# Patient Record
Sex: MALE | Race: WHITE | NOT HISPANIC OR LATINO | Employment: OTHER | URBAN - METROPOLITAN AREA
[De-identification: names, ages, dates, MRNs, and addresses within clinical notes are randomized per-mention and may not be internally consistent; named-entity substitution may affect disease eponyms.]

---

## 2017-01-04 ENCOUNTER — PROCEDURE VISIT (OUTPATIENT)
Dept: OPHTHALMOLOGY | Facility: CLINIC | Age: 57
End: 2017-01-04
Payer: COMMERCIAL

## 2017-01-04 DIAGNOSIS — Z79.4 TYPE 2 DIABETES MELLITUS WITH BOTH EYES AFFECTED BY PROLIFERATIVE RETINOPATHY AND MACULAR EDEMA, WITH LONG-TERM CURRENT USE OF INSULIN: Primary | ICD-10-CM

## 2017-01-04 DIAGNOSIS — E11.3513 TYPE 2 DIABETES MELLITUS WITH BOTH EYES AFFECTED BY PROLIFERATIVE RETINOPATHY AND MACULAR EDEMA, WITH LONG-TERM CURRENT USE OF INSULIN: Primary | ICD-10-CM

## 2017-01-04 PROCEDURE — 96372 THER/PROPH/DIAG INJ SC/IM: CPT | Mod: S$GLB,,, | Performed by: OPHTHALMOLOGY

## 2017-01-04 RX ORDER — LISINOPRIL 20 MG/1
20 TABLET ORAL NIGHTLY
Refills: 4 | COMMUNITY
Start: 2016-12-24

## 2017-01-04 RX ORDER — ATENOLOL 100 MG/1
100 TABLET ORAL 2 TIMES DAILY
Refills: 3 | COMMUNITY
Start: 2016-11-21 | End: 2018-01-10

## 2017-01-04 RX ORDER — SITAGLIPTIN 25 MG/1
25 TABLET, FILM COATED ORAL DAILY
Refills: 3 | COMMUNITY
Start: 2016-12-24 | End: 2018-01-10

## 2017-01-04 RX ORDER — BUPROPION HYDROCHLORIDE 300 MG/1
300 TABLET ORAL DAILY
Refills: 3 | COMMUNITY
Start: 2016-10-14

## 2017-01-04 RX ORDER — CIPROFLOXACIN HYDROCHLORIDE 3 MG/ML
1 SOLUTION/ DROPS OPHTHALMIC 4 TIMES DAILY
Qty: 5 ML | Refills: 0 | Status: SHIPPED | OUTPATIENT
Start: 2017-01-04 | End: 2017-01-08

## 2017-01-04 RX ORDER — DEXTROSE 4 G
TABLET,CHEWABLE ORAL
COMMUNITY
Start: 2016-09-19

## 2017-01-04 RX ORDER — INSULIN GLARGINE 100 [IU]/ML
INJECTION, SOLUTION SUBCUTANEOUS
Refills: 3 | COMMUNITY
Start: 2016-11-19 | End: 2018-01-10 | Stop reason: SDUPTHER

## 2017-01-04 RX ORDER — ROSUVASTATIN CALCIUM 20 MG/1
20 TABLET, COATED ORAL DAILY
Refills: 3 | COMMUNITY
Start: 2016-12-24 | End: 2017-12-07 | Stop reason: SDUPTHER

## 2017-01-04 RX ADMIN — Medication 1.25 MG: at 04:01

## 2017-01-04 NOTE — PROGRESS NOTES
===============================  Barrett Zamora,   56 y.o. male   Last visit [facility]: :12/13/2016   Last visit eye dept. 12/13/2016  [facility]:  Uncorrected distance visual acuity was CF at 3' in the right eye and CF at 3' in the left eye.   Not recorded         Not recorded         Not recorded        Chief Complaint   Patient presents with    PDR/ME     IVFA/ AVASTIN OU        HPI     PDR/ME    Additional comments: IVFA/ AVASTIN OU           Comments   DM  Bdr  CSME ou. Os>od  Eylea OS#1= 2/23/16       Last edited by Kim Saenz on 1/4/2017  2:18 PM. (History)      Read Studies: y  Vitalsy    ________________  1/4/2017  1. Type 2 diabetes mellitus with both eyes affected by proliferative retinopathy and macular edema, with long-term current use of insulin      .  Very active pdr od  And $+ dme ou   1/4/2017  Diagnosis :  Ou dme pdr  Today:   Avastin (Bevacizumab) 1.25 mg/0.05 ml Intravitreal Injection , OU   Follow up: rtc 2 weeks begin prp od      Call 24/7 for any worsening of vision. Check  OU QD. Gave my home phone number.      Procedure  Note:   OU}  Avastin (Bevacizumab) 1.25 mg/0.05 ml Intravitreal Injection    I have explained the Risks, Benefits and Alternatives of the procedure in detail.  The patient voices understanding and all questions have been answered.  The patient agrees to proceed as discussed.  LIDOCAINE 2%  subconj bleb  was used for anesthesia.  Topical betadine was used for antisepsis.  0.05 cc was  injected 3.7 mm from corneal limbus in the inferotemporal quadrant.  Following injection the IOP was less than thirty (<30) by tonopen.  The eye was then thoroughly irrigated with BSS.  Patient tolerated procedure well.  No complications were observed.  The Patient was educated that mild irritation tonight was normal secondary to topical antispsis use.  Pt was advised to call at any time day or night for pain, redness, or any decline in vision. I gave the patient my home number as well as the  clinic on call number. Daily visual checks and Amsler grid testing were reviewed.    CURT Costa MD  Procedure ordered: y  Consent: y  Pre auth: y  MAR:y  Opnote: y  Charge capture:y  Sided procedure note: y     ===========================

## 2017-01-18 ENCOUNTER — PROCEDURE VISIT (OUTPATIENT)
Dept: OPHTHALMOLOGY | Facility: CLINIC | Age: 57
End: 2017-01-18
Payer: COMMERCIAL

## 2017-01-18 DIAGNOSIS — Z79.4 TYPE 2 DIABETES MELLITUS WITH BOTH EYES AFFECTED BY PROLIFERATIVE RETINOPATHY AND MACULAR EDEMA, WITH LONG-TERM CURRENT USE OF INSULIN: Primary | ICD-10-CM

## 2017-01-18 DIAGNOSIS — E11.3513 TYPE 2 DIABETES MELLITUS WITH BOTH EYES AFFECTED BY PROLIFERATIVE RETINOPATHY AND MACULAR EDEMA, WITH LONG-TERM CURRENT USE OF INSULIN: Primary | ICD-10-CM

## 2017-01-18 PROCEDURE — 99499 UNLISTED E&M SERVICE: CPT | Mod: S$GLB,,, | Performed by: OPHTHALMOLOGY

## 2017-01-18 RX ORDER — INSULIN LISPRO 100 [IU]/ML
INJECTION, SOLUTION INTRAVENOUS; SUBCUTANEOUS
COMMUNITY
Start: 2016-05-05 | End: 2017-11-08 | Stop reason: CLARIF

## 2017-01-18 NOTE — PROGRESS NOTES
===============================  Barrett Zamora,   56 y.o. male   Last visit [facility]: :1/4/2017   Last visit eye dept. 1/4/2017  [facility]:  Uncorrected distance visual acuity was 20/400 in the right eye and 20/80 in the left eye.   Not recorded         Not recorded         Not recorded        Chief Complaint   Patient presents with    PDR/ME     PRP OD        HPI     PDR/ME    Additional comments: PRP OD           Comments   DM  Bdr  CSME ou. Os>od  Eylea OS#1= 2/23/16  AVASTIN OU 1/4/17       Last edited by Kim Saenz on 1/18/2017  2:22 PM. (History)      Read Studies: n  Vitalsy    ________________  1/18/2017  1. Type 2 diabetes mellitus with both eyes affected by proliferative retinopathy and macular edema, with long-term current use of insulin      1/18/2017  Diagnosis :  pdr OD}   Plan : prp     OD} today  Next visit :     Return as scheduled for avastin      Laser Procedure Note    Laser: prp  ARGON  Power: 700  Duration: 10  Interval: 250  Number: [id number]  Lens centralis  Complications: None  Procedure ordered: y  Consent: y  Opnote: y  Charge capture:y  Sided procedure note: y             ===========================

## 2017-01-18 NOTE — MR AVS SNAPSHOT
[facility] - Ophthalmology  [address] [facility] [address] [place] [address]  Phone: [phone]  Fax: [phone]                  Barrett Zamora   2017 2:15 PM   Procedure visit    Description:  Male : 1960   Provider:  CURT Costa MD   Department:  [facility] - Ophthalmology           Reason for Visit     PDR/ME           Diagnoses this Visit        Comments    Type 2 diabetes mellitus with both eyes affected by proliferative retinopathy and macular edema, with long-term current use of insulin    -  Primary            To Do List           Future Appointments        Provider Department Dept Phone    2017 1:30 PM CURT Costa MD [facility] Ophthalmology [phone]      Goals (5 Years of Data)     None      Follow-Up and Disposition     Return in about 6 weeks (around 3/1/2017) for avastin.      [facility] On Call     [facility] On Call Nurse Care Line -  Assistance  Registered nurses in the [facility] On Call Center provide clinical advisement, health education, appointment booking, and other advisory services.  Call for this free service at [phone].             Medications           Message regarding Medications     Verify the changes and/or additions to your medication regime listed below are the same as discussed with your clinician today.  If any of these changes or additions are incorrect, please notify your healthcare provider.             Verify that the below list of medications is an accurate representation of the medications you are currently taking.  If none reported, the list may be blank. If incorrect, please contact your healthcare provider. Carry this list with you in case of emergency.           Current Medications     aspirin (ECOTRIN) 325 MG EC tablet Take 0 tablets by mouth.    atenolol (TENORMIN) 100 MG tablet Take 100 mg by mouth 2 (two) times daily.    atenolol (TENORMIN) 25 MG tablet     blood sugar diagnostic Strp Test blood sugar 4 times a day    blood-glucose meter  "(ACCU-CHEK BECCA) [facility] Accu-Chek Becca Smartview. Use to check blood sugar    buPROPion (WELLBUTRIN XL) 300 MG 24 hr tablet Take 300 mg by mouth once daily.    duloxetine (CYMBALTA) 60 MG capsule TAKE 1 CAPSULE BY MOUTH DAILY.    fenofibrate (TRICOR) 145 MG tablet TAKE 1 TABLET BY MOUTH DAILY    flaxseed oil 1,000 mg Cap 1,000 mg.    furosemide (LASIX) 20 MG tablet 20 mg.    glipizide-metformin (METAGLIP) 5-500 mg per tablet Take 1 tablet by mouth 2 (two) times daily before meals.    insulin glargine (LANTUS) 100 unit/mL Crtg     insulin lispro (HUMALOG KWIKPEN) 100 unit/mL InPn pen INJECT UP TO 40 UNITS INTO THE SKIN DAILY PER SLIDING SCALE    insulin lispro (HUMALOG KWIKPEN) 100 unit/mL InPn pen INJECT UP TO 40 UNITS INTO SKIN DAILY PER SLIDING SCALE    JANUVIA 25 mg Tab Take 25 mg by mouth once daily.    lancets Misc USE TO TEST BLOOD SUGAR 3 TIMES DAILY AS DIRECTED    LANTUS SOLOSTAR 100 unit/mL (3 mL) InPn pen INJECT 80 UNITS INTO THE SKIN 2 TIMES DAILY AS DIRECTED    lisinopril (PRINIVIL,ZESTRIL) 20 MG tablet Take 20 mg by mouth every evening.    lisinopril (PRINIVIL,ZESTRIL) 5 MG tablet Take 0 tablets by mouth.    neomycin-polymyxin-dexamethasone (MAXITROL) 3.5mg/mL-10,000 unit/mL-0.1 % DrpS INSTILL 1 DROP INTO BOTH EYES 4 TIMES A DAY    nitroGLYCERIN (NITROSTAT) 0.3 MG SL tablet     pen needle, diabetic 31 gauge x 5/16" Ndle USE AS DIRECTED    prasugrel (EFFIENT) 10 mg Tab Take 10 mg by mouth once daily.    rosuvastatin (CRESTOR) 20 MG tablet Take 20 mg by mouth once daily.    rosuvastatin (CRESTOR) 40 MG Tab Take 10 mg by mouth every evening.    saxagliptin (ONGLYZA) 2.5 mg Tab tablet 1 tablet.    simvastatin (ZOCOR) 40 MG tablet Take 40 mg by mouth every evening.           Clinical Reference Information           Allergies as of 1/18/2017     No Known Drug Allergies      Immunizations Administered on Date of Encounter - 1/18/2017     None      Orders Placed During Today's Visit      Normal Orders This Visit "    Pan Retinal Photocoagulation - OD - Right Eye

## 2017-02-08 ENCOUNTER — PROCEDURE VISIT (OUTPATIENT)
Dept: OPHTHALMOLOGY | Facility: CLINIC | Age: 57
End: 2017-02-08
Payer: COMMERCIAL

## 2017-02-08 DIAGNOSIS — E11.3513 TYPE 2 DIABETES MELLITUS WITH BOTH EYES AFFECTED BY PROLIFERATIVE RETINOPATHY AND MACULAR EDEMA, WITH LONG-TERM CURRENT USE OF INSULIN: Primary | ICD-10-CM

## 2017-02-08 DIAGNOSIS — Z79.4 TYPE 2 DIABETES MELLITUS WITH BOTH EYES AFFECTED BY PROLIFERATIVE RETINOPATHY AND MACULAR EDEMA, WITH LONG-TERM CURRENT USE OF INSULIN: Primary | ICD-10-CM

## 2017-02-08 PROCEDURE — 92134 CPTRZ OPH DX IMG PST SGM RTA: CPT | Mod: S$GLB,,, | Performed by: OPHTHALMOLOGY

## 2017-02-08 PROCEDURE — 67028 INJECTION EYE DRUG: CPT | Mod: 50,S$GLB,, | Performed by: OPHTHALMOLOGY

## 2017-02-08 PROCEDURE — 99499 UNLISTED E&M SERVICE: CPT | Mod: S$GLB,,, | Performed by: OPHTHALMOLOGY

## 2017-02-08 RX ADMIN — Medication 1.25 MG: at 02:02

## 2017-02-08 NOTE — MR AVS SNAPSHOT
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      [facility]     [facility] On Call Nurse Care Line -  Assistance  Registered nurses in the [facility] On Call Center provide clinical advisement, health education, appointment booking, and other advisory services.  Call for this free service at [phone].             Medications           Message regarding Medications     Verify the changes and/or additions to your medication regime listed below are the same as discussed with your clinician today.  If any of these changes or additions are incorrect, please notify your healthcare provider.        These medications were administered today        Dose Freq    bevacizumab (AVASTIN) 2.5 mg/0.10 mL 1.25 mg 1.25 mg Clinic/HOD 1 time    Sig: Inject 0.05 mLs (1.25 mg total) into the eye one time.    Class: Normal    Route: Intraocular    bevacizumab (AVASTIN) 2.5 mg/0.10 mL 1.25 mg 1.25 mg Clinic/HOD 1 time    Sig: Inject 0.05 mLs (1.25 mg total) into the eye one time.    Class: Normal    Route: Intraocular           Verify that the below list of medications is an accurate representation of the medications you are currently taking.  If none reported, the list may be blank. If incorrect, please contact your healthcare provider. Carry this list with you in case of emergency.           Current Medications   "   aspirin (ECOTRIN) 325 MG EC tablet Take 0 tablets by mouth.    atenolol (TENORMIN) 100 MG tablet Take 100 mg by mouth 2 (two) times daily.    atenolol (TENORMIN) 25 MG tablet     blood sugar diagnostic Strp Test blood sugar 4 times a day    blood-glucose meter (ACCU-CHEK BECCA) Misc Accu-Chek Becca Smartview. Use to check blood sugar    buPROPion (WELLBUTRIN XL) 300 MG 24 hr tablet Take 300 mg by mouth once daily.    duloxetine (CYMBALTA) 60 MG capsule TAKE 1 CAPSULE BY MOUTH DAILY.    fenofibrate (TRICOR) 145 MG tablet TAKE 1 TABLET BY MOUTH DAILY    flaxseed oil 1,000 mg Cap 1,000 mg.    furosemide (LASIX) 20 MG tablet 20 mg.    glipizide-metformin (METAGLIP) 5-500 mg per tablet Take 1 tablet by mouth 2 (two) times daily before meals.    insulin glargine (LANTUS) 100 unit/mL Crtg     insulin lispro (HUMALOG KWIKPEN) 100 unit/mL InPn pen INJECT UP TO 40 UNITS INTO THE SKIN DAILY PER SLIDING SCALE    insulin lispro (HUMALOG KWIKPEN) 100 unit/mL InPn pen INJECT UP TO 40 UNITS INTO SKIN DAILY PER SLIDING SCALE    JANUVIA 25 mg Tab Take 25 mg by mouth once daily.    lancets Misc USE TO TEST BLOOD SUGAR 3 TIMES DAILY AS DIRECTED    LANTUS SOLOSTAR 100 unit/mL (3 mL) InPn pen INJECT 80 UNITS INTO THE SKIN 2 TIMES DAILY AS DIRECTED    lisinopril (PRINIVIL,ZESTRIL) 20 MG tablet Take 20 mg by mouth every evening.    lisinopril (PRINIVIL,ZESTRIL) 5 MG tablet Take 0 tablets by mouth.    neomycin-polymyxin-dexamethasone (MAXITROL) 3.5mg/mL-10,000 unit/mL-0.1 % DrpS INSTILL 1 DROP INTO BOTH EYES 4 TIMES A DAY    nitroGLYCERIN (NITROSTAT) 0.3 MG SL tablet     pen needle, diabetic 31 gauge x 5/16" Ndle USE AS DIRECTED    prasugrel (EFFIENT) 10 mg Tab Take 10 mg by mouth once daily.    rosuvastatin (CRESTOR) 20 MG tablet Take 20 mg by mouth once daily.    rosuvastatin (CRESTOR) 40 MG Tab Take 10 mg by mouth every evening.    saxagliptin (ONGLYZA) 2.5 mg Tab tablet 1 tablet.    simvastatin (ZOCOR) 40 MG tablet Take 40 mg by mouth " every evening.           Clinical Reference Information           Allergies as of 2/8/2017     No Known Drug Allergies      Immunizations Administered on Date of Encounter - 2/8/2017     None      Orders Placed During Today's Visit      Normal Orders This Visit    Posterior Segment OCT Retina-Both eyes     Prior Authorization Order       Language Assistance Services     ATTENTION: Language assistance services are available, free of charge. Please call [phone].      ATENCIÓN: Si habla valarie, tiene a carpenter disposición servicios gratuitos de asistencia lingüística. Llame al [phone].     CHÚ Ý: N?u b?n nói Ti?ng Vi?t, có các d?ch v? h? tr? ngôn ng? mi?n phí dành cho b?n. G?i s? [phone].         [facility] complies with applicable Federal civil rights laws and does not discriminate on the basis of race, color, national origin, age, disability, or sex.

## 2017-02-08 NOTE — PROGRESS NOTES
===============================  Barrett Zamora,   56 y.o. male   Last visit [facility]: :1/18/2017   Last visit eye dept. 1/18/2017  [facility]:  Uncorrected distance visual acuity was 20/200 in the right eye and 20/80 in the left eye.   Not recorded         Not recorded         Not recorded        Chief Complaint   Patient presents with    PDR / CSME     s/p recent avastin ou  / s/p prp od        HPI     PDR / CSME    Additional comments: s/p recent avastin ou  / s/p prp od           Comments   DM  Bdr  CSME ou. Os>od  Eylea OS#1= 2/23/16  AVASTIN OU 1/4/17  PRP OD 1/18/17       Last edited by REJI Bah on 2/8/2017  1:44 PM. (History)      Read Studies: ***  Vitals***    ________________  2/8/2017  1. Type 2 diabetes mellitus with both eyes affected by proliferative retinopathy and macular edema, with long-term current use of insulin      ***.  Sp ou tx for dme ash undergoing prp  Prp#2 today           ===========================

## 2017-02-08 NOTE — PROGRESS NOTES
===============================  Barrett Zamora,   56 y.o. male   Last visit [facility]: :1/18/2017   Last visit eye dept. 1/18/2017  [facility]:  Uncorrected distance visual acuity was 20/200 in the right eye and 20/80 in the left eye.   Not recorded         Not recorded         Not recorded        Chief Complaint   Patient presents with    PDR / CSME     s/p recent avastin ou  / s/p prp od        HPI     PDR / CSME    Additional comments: s/p recent avastin ou  / s/p prp od           Comments   DM  Bdr  CSME ou. Os>od  Eylea OS#1= 2/23/16  AVASTIN OU 1/4/17  PRP OD 1/18/17       Last edited by REJI Bah on 2/8/2017  1:44 PM. (History)      Read Studies: y  Vitalsy    ________________  2/8/2017  1. Type 2 diabetes mellitus with both eyes affected by proliferative retinopathy and macular edema, with long-term current use of insulin      .  Ou dme  Recent ou avstin 1/1/17  Then prp od 2 weeks ago   Today oct s l better od - but + me  Os dme sl worse   rec ou avstin today rtc 1 month cosider eylea   So plan:  eyela today and 1 more then 2 week interval prp od  afte eyele#2    2/8/2017  Diagnosis :  oou dme- sl worse os  Today:   Eylea (afibercept) 2 mg/0.05 ml Intravitreal Injection , OU   Follow up: rtc 1 mo eyela -         Call 24/7 for any worsening of vision. Check  OU QD. Gave my home phone number.      Procedure  Note:   OU}  Eylea (afibercept) 2 mg/0.05 ml Intravitreal Injection    I have explained the Risks, Benefits and Alternatives of the procedure in detail.  The patient voices understanding and all questions have been answered.  The patient agrees to proceed as discussed.  LIDOCAINE 2%  subconj bleb  was used for anesthesia.  Topical betadine was used for antisepsis.  0.05 cc was  injected 3.7 mm from corneal limbus in the inferotemporal quadrant.  Following injection the IOP was less than thirty (<30) by tonopen.  The eye was then thoroughly irrigated with BSS.  Patient tolerated procedure well.  No  complications were observed.  The Patient was educated that mild irritation tonight was normal secondary to topical antispsis use.  Pt was advised to call at any time day or night for pain, redness, or any decline in vision. I gave the patient my home number as well as the clinic on call number. Daily visual checks and Amsler grid testing were reviewed.  ciloxan Antibiotic Drops to be used 4 times daily for 4 days  CURT Costa MD  Procedure ordered: y  Consent: y  Pre auth: y  MAR:y  Opnote: y  Charge capture:y  Sided procedure note: y       ===========================

## 2017-03-30 ENCOUNTER — PROCEDURE VISIT (OUTPATIENT)
Dept: OPHTHALMOLOGY | Facility: CLINIC | Age: 57
End: 2017-03-30
Payer: COMMERCIAL

## 2017-03-30 DIAGNOSIS — E11.3513 TYPE 2 DIABETES MELLITUS WITH BOTH EYES AFFECTED BY PROLIFERATIVE RETINOPATHY AND MACULAR EDEMA, WITH LONG-TERM CURRENT USE OF INSULIN: Primary | ICD-10-CM

## 2017-03-30 DIAGNOSIS — Z79.4 TYPE 2 DIABETES MELLITUS WITH BOTH EYES AFFECTED BY PROLIFERATIVE RETINOPATHY AND MACULAR EDEMA, WITH LONG-TERM CURRENT USE OF INSULIN: Primary | ICD-10-CM

## 2017-03-30 PROCEDURE — 92134 CPTRZ OPH DX IMG PST SGM RTA: CPT | Mod: S$GLB,,, | Performed by: OPHTHALMOLOGY

## 2017-03-30 PROCEDURE — 67028 INJECTION EYE DRUG: CPT | Mod: 50,S$GLB,, | Performed by: OPHTHALMOLOGY

## 2017-03-30 PROCEDURE — 99499 UNLISTED E&M SERVICE: CPT | Mod: S$GLB,,, | Performed by: OPHTHALMOLOGY

## 2017-03-30 RX ORDER — CIPROFLOXACIN HYDROCHLORIDE 3 MG/ML
1 SOLUTION/ DROPS OPHTHALMIC 4 TIMES DAILY
Qty: 5 ML | Refills: 0 | Status: SHIPPED | OUTPATIENT
Start: 2017-03-30 | End: 2017-04-03

## 2017-03-30 RX ORDER — PEN NEEDLE, DIABETIC 31 GX5/16"
NEEDLE, DISPOSABLE MISCELLANEOUS
Refills: 3 | COMMUNITY
Start: 2017-01-03

## 2017-03-30 RX ORDER — LISINOPRIL 10 MG/1
TABLET ORAL
Refills: 6 | COMMUNITY
Start: 2017-03-07 | End: 2018-01-10 | Stop reason: SDUPTHER

## 2017-03-30 NOTE — MR AVS SNAPSHOT
[facility] - Ophthalmology  [address] [facility] [address] [place] [address]  Phone: [phone]  Fax: [phone]                  Barrett Zamora   3/30/2017 8:30 AM   Procedure visit    Description:  Male : 1960   Provider:  CURT Costa MD   Department:  [facility] - Ophthalmology           Reason for Visit     PDR           Diagnoses this Visit        Comments    Type 2 diabetes mellitus with both eyes affected by proliferative retinopathy and macular edema, with long-term current use of insulin    -  Primary            To Do List           Goals (5 Years of Data)     None      Follow-Up and Disposition     Return in about 1 month (around 2017).       These Medications        Disp Refills Start End    ciprofloxacin HCl (CILOXAN) 0.3 % ophthalmic solution 5 mL 0 3/30/2017 4/3/2017    Place 1 drop into both eyes 4 (four) times daily. - Both Eyes    Pharmacy: [facility]/pharmacy #[id number] - [place], [place] - [address].  #: [phone]         [facility] On Call     [facility] On Call Nurse Care Line -  Assistance  Unless otherwise directed by your provider, please contact [facility], our nurse care line that is available for  assistance.     Registered nurses in the [facility] On Call Center provide: appointment scheduling, clinical advisement, health education, and other advisory services.  Call: [phone] (toll free)               Medications           Message regarding Medications     Verify the changes and/or additions to your medication regime listed below are the same as discussed with your clinician today.  If any of these changes or additions are incorrect, please notify your healthcare provider.        START taking these NEW medications        Refills    ciprofloxacin HCl (CILOXAN) 0.3 % ophthalmic solution 0    Sig: Place 1 drop into both eyes 4 (four) times daily.    Class: Normal    Route: Both Eyes      These medications were administered today        Dose Freq    aflibercept  "Soln 2 mg 2 mg Clinic/HOD 1 time    Si.05 mLs (2 mg total) by Intravitreal route one time.    Class: Normal    Route: Intravitreal    aflibercept Soln 2 mg 2 mg Clinic/HOD 1 time    Si.05 mLs (2 mg total) by Intravitreal route one time.    Class: Normal    Route: Intravitreal           Verify that the below list of medications is an accurate representation of the medications you are currently taking.  If none reported, the list may be blank. If incorrect, please contact your healthcare provider. Carry this list with you in case of emergency.           Current Medications     aspirin (ECOTRIN) 325 MG EC tablet Take 0 tablets by mouth.    atenolol (TENORMIN) 100 MG tablet Take 100 mg by mouth 2 (two) times daily.    BD INSULIN PEN NEEDLE UF MINI 31 gauge x 3/16" Ndle USE AS DIRECTED WITH INSULIN MAX 5 PER DAY    blood sugar diagnostic Strp Test blood sugar 4 times a day    blood-glucose meter (ACCU-CHEK BECCA) Mis Accu-Chek Becca Smartview. Use to check blood sugar    buPROPion (WELLBUTRIN XL) 300 MG 24 hr tablet Take 300 mg by mouth once daily.    ciprofloxacin HCl (CILOXAN) 0.3 % ophthalmic solution Place 1 drop into both eyes 4 (four) times daily.    duloxetine (CYMBALTA) 60 MG capsule TAKE 1 CAPSULE BY MOUTH DAILY.    fenofibrate (TRICOR) 145 MG tablet TAKE 1 TABLET BY MOUTH DAILY    flaxseed oil 1,000 mg Cap 1,000 mg.    furosemide (LASIX) 20 MG tablet 20 mg.    glipizide-metformin (METAGLIP) 5-500 mg per tablet Take 1 tablet by mouth 2 (two) times daily before meals.    insulin glargine (LANTUS) 100 unit/mL Crtg     insulin lispro (HUMALOG KWIKPEN) 100 unit/mL InPn pen INJECT UP TO 40 UNITS INTO THE SKIN DAILY PER SLIDING SCALE    insulin lispro (HUMALOG KWIKPEN) 100 unit/mL InPn pen INJECT UP TO 40 UNITS INTO SKIN DAILY PER SLIDING SCALE    JANUVIA 25 mg Tab Take 25 mg by mouth once daily.    lancets Misc USE TO TEST BLOOD SUGAR 3 TIMES DAILY AS DIRECTED    LANTUS SOLOSTAR 100 unit/mL (3 mL) InPn pen " "INJECT 80 UNITS INTO THE SKIN 2 TIMES DAILY AS DIRECTED    lisinopril (PRINIVIL,ZESTRIL) 20 MG tablet Take 20 mg by mouth every evening.    lisinopril (PRINIVIL,ZESTRIL) 5 MG tablet Take 0 tablets by mouth.    lisinopril 10 MG tablet TAKE 1 TABLET BY MOUTH EVERY MORNING AND 2 TABLET EVERY EVENING    neomycin-polymyxin-dexamethasone (MAXITROL) 3.5mg/mL-10,000 unit/mL-0.1 % DrpS INSTILL 1 DROP INTO BOTH EYES 4 TIMES A DAY    nitroGLYCERIN (NITROSTAT) 0.3 MG SL tablet     pen needle, diabetic 31 gauge x 5/16" Ndle USE AS DIRECTED    prasugrel (EFFIENT) 10 mg Tab Take 10 mg by mouth once daily.    rosuvastatin (CRESTOR) 20 MG tablet Take 20 mg by mouth once daily.    rosuvastatin (CRESTOR) 40 MG Tab Take 10 mg by mouth every evening.    saxagliptin (ONGLYZA) 2.5 mg Tab tablet 1 tablet.    simvastatin (ZOCOR) 40 MG tablet Take 40 mg by mouth every evening.           Clinical Reference Information           Allergies as of 3/30/2017     No Known Drug Allergies      Immunizations Administered on Date of Encounter - 3/30/2017     None      Orders Placed During Today's Visit      Normal Orders This Visit    Posterior Segment OCT Retina-Both eyes     Prior Authorization Order       Language Assistance Services     ATTENTION: Language assistance services are available, free of charge. Please call [phone].      ATENCIÓN: Si yeimila valarie, tiene a carpenter disposición servicios gratuitos de asistencia lingüística. Llame al [phone].     [facility] Ý: N?u b?n nói Ti?ng Vi?t, có các d?ch v? h? tr? ngôn ng? mi?n phí dành cho b?n. G?i s? [phone].         [facility] complies with applicable Federal civil rights laws and does not discriminate on the basis of race, color, national origin, age, disability, or sex.        "

## 2017-03-30 NOTE — PROGRESS NOTES
===============================  Barrett Zamora,   56 y.o. male   Last visit [facility]: :2/8/2017   Last visit eye dept. 2/8/2017  [place]  Uncorrected distance visual acuity was 20/100 in the right eye and 20/80 in the left eye.   Not recorded         Not recorded        Manifest Refraction     Manifest Refraction      Sphere Dist Add   Right -0.75 20/60 +2.00   Left -1.25 20/25 +2.00                 Chief Complaint   Patient presents with    PDR     EYLEA OU, pt wants new gl rx        HPI     PDR    Additional comments: EYLEA OU, pt wants new gl rx           Comments   DM  Bdr  CSME ou. Os>od  Eylea OS#1= 2/23/16  EYLEA OU 2/8/17  AVASTIN OU 1/4/17  PRP OD 1/18/17       Last edited by REJI Bah on 3/30/2017  9:06 AM. (History)      Read Studies: y  Vitalsy    ________________  3/30/2017  Problem List Items Addressed This Visit        Eye/Vision problems    Diabetes mellitus - Primary    Relevant Medications    ciprofloxacin HCl (CILOXAN) 0.3 % ophthalmic solution    aflibercept Soln 2 mg (Start on 3/30/2017 10:30 AM)    aflibercept Soln 2 mg (Completed) (Start on 3/30/2017 10:30 AM)    Other Relevant Orders    Posterior Segment OCT Retina-Both eyes (Completed)    Prior Authorization Order        .  terting ou dme   Sl better ou   rec eyela   First eyela today  3/30/2017  Diagnosis :  Ou dme  Today:   Eylea (afibercept) 2 mg/0.05 ml Intravitreal Injection , OU   Follow up: rtc 1 mo for eyel a#2 ou        Call 24/7 for any worsening of vision. Check  OU QD. Gave my home phone number.      Procedure  Note:   OU}  Eylea (afibercept) 2 mg/0.05 ml Intravitreal Injection    I have explained the Risks, Benefits and Alternatives of the procedure in detail.  The patient voices understanding and all questions have been answered.  The patient agrees to proceed as discussed.  LIDOCAINE 2%  subconj bleb  was used for anesthesia.  Topical betadine was used for antisepsis.  0.05 cc was  injected 3.7 mm from corneal  limbus in the inferotemporal quadrant.  Following injection the IOP was less than thirty (<30) by tonopen.  The eye was then thoroughly irrigated with BSS.  Patient tolerated procedure well.  No complications were observed.  The Patient was educated that mild irritation tonight was normal secondary to topical antispsis use.  Pt was advised to call at any time day or night for pain, redness, or any decline in vision. I gave the patient my home number as well as the clinic on call number. Daily visual checks and Amsler grid testing were reviewed.  Ciloxan Antibiotic Drops to be used 4 times daily for 4 days  CURT Costa MD  Procedure ordered: y  Consent: y  Pre auth: y  MAR:y  Opnote: y  Charge capture:y  Sided procedure note: y       ===========================

## 2017-04-28 ENCOUNTER — PROCEDURE VISIT (OUTPATIENT)
Dept: OPHTHALMOLOGY | Facility: CLINIC | Age: 57
End: 2017-04-28
Payer: COMMERCIAL

## 2017-04-28 DIAGNOSIS — E11.3513 TYPE 2 DIABETES MELLITUS WITH BOTH EYES AFFECTED BY PROLIFERATIVE RETINOPATHY AND MACULAR EDEMA, WITH LONG-TERM CURRENT USE OF INSULIN: Primary | ICD-10-CM

## 2017-04-28 DIAGNOSIS — Z79.4 TYPE 2 DIABETES MELLITUS WITH BOTH EYES AFFECTED BY PROLIFERATIVE RETINOPATHY AND MACULAR EDEMA, WITH LONG-TERM CURRENT USE OF INSULIN: Primary | ICD-10-CM

## 2017-04-28 PROCEDURE — 92134 CPTRZ OPH DX IMG PST SGM RTA: CPT | Mod: S$GLB,,, | Performed by: OPHTHALMOLOGY

## 2017-04-28 PROCEDURE — 99499 UNLISTED E&M SERVICE: CPT | Mod: S$GLB,,, | Performed by: OPHTHALMOLOGY

## 2017-04-28 PROCEDURE — 67028 INJECTION EYE DRUG: CPT | Mod: 50,S$GLB,, | Performed by: OPHTHALMOLOGY

## 2017-04-28 RX ORDER — FENOFIBRATE 145 MG/1
TABLET, FILM COATED ORAL
Refills: 3 | COMMUNITY
Start: 2017-04-10 | End: 2017-11-08 | Stop reason: CLARIF

## 2017-04-28 NOTE — PROGRESS NOTES
===============================  04/28/2017   Barrett Zamora,   56 y.o. male   Last visit [facility]: :3/30/2017   Last visit eye dept. 3/30/2017  [place]  Corrected distance visual acuity was 20/70 in the right eye and 20/30 in the left eye.   Not recorded        Wearing Rx     Wearing Rx      Sphere Cylinder Add   Right -1.25 Sphere +1.75   Left -1.50 Sphere +1.75                  Not recorded        Chief Complaint   Patient presents with    PDR     EYLEA OU        HPI     PDR    Additional comments: EYLEA OU           Comments   DM  Bdr  CSME ou. Os>od  Eylea OS#1= 2/23/16  EYLEA OU 3/30/17  AVASTIN OU 1/4/17  PRP OD 1/18/17       Last edited by Kim Saenz on 4/28/2017  1:54 PM. (History)      Read Studies:   Vitals  ________________  4/28/2017  Problem List Items Addressed This Visit     None        .  .  Better! Ou   much better than avstin]    4/28/2017  Diagnosis :  Ou eyela#2  Today:   Eylea (afibercept) 2 mg/0.05 ml Intravitreal Injection , OU   Follow up: rtc 1 mo scheduled eyola#3        Call 24/7 for any worsening of vision. Check  OU QD. Gave my home phone number.      Procedure  Note:   OU}  Eylea (afibercept) 2 mg/0.05 ml Intravitreal Injection    I have explained the Risks, Benefits and Alternatives of the procedure in detail.  The patient voices understanding and all questions have been answered.  The patient agrees to proceed as discussed.  LIDOCAINE 2%  subconj bleb  was used for anesthesia.  Topical betadine was used for antisepsis.  0.05 cc was  injected 3.7 mm from corneal limbus in the inferotemporal quadrant.  Following injection the IOP was less than thirty (<30) by tonopen.  The eye was then thoroughly irrigated with BSS.  Patient tolerated procedure well.  No complications were observed.  The Patient was educated that mild irritation tonight was normal secondary to topical antispsis use.  Pt was advised to call at any time day or night for pain, redness, or any decline in vision. I  gave the patient my home number as well as the clinic on call number. Daily visual checks and Amsler grid testing were reviewed.  ciloxan Antibiotic Drops to be used 4 times daily for 4 days  CURT Costa MD  Procedure ordered: y  Consent: y  Pre auth: y  MAR:y  Opnote: y  Charge capture:y  Sided procedure note: y       ===========================

## 2017-05-26 ENCOUNTER — TELEPHONE (OUTPATIENT)
Dept: OPHTHALMOLOGY | Facility: CLINIC | Age: 57
End: 2017-05-26

## 2017-05-26 NOTE — TELEPHONE ENCOUNTER
----- Message from Courtney Quigley sent at 5/26/2017 11:09 AM CDT -----  Contact: pt  [phone] need to reschedule proc

## 2017-05-31 ENCOUNTER — PROCEDURE VISIT (OUTPATIENT)
Dept: OPHTHALMOLOGY | Facility: CLINIC | Age: 57
End: 2017-05-31
Payer: COMMERCIAL

## 2017-05-31 DIAGNOSIS — E11.3513 TYPE 2 DIABETES MELLITUS WITH BOTH EYES AFFECTED BY PROLIFERATIVE RETINOPATHY AND MACULAR EDEMA, WITH LONG-TERM CURRENT USE OF INSULIN: Primary | ICD-10-CM

## 2017-05-31 DIAGNOSIS — Z79.4 TYPE 2 DIABETES MELLITUS WITH BOTH EYES AFFECTED BY PROLIFERATIVE RETINOPATHY AND MACULAR EDEMA, WITH LONG-TERM CURRENT USE OF INSULIN: Primary | ICD-10-CM

## 2017-05-31 PROCEDURE — 99499 UNLISTED E&M SERVICE: CPT | Mod: S$GLB,,, | Performed by: OPHTHALMOLOGY

## 2017-05-31 PROCEDURE — 67028 INJECTION EYE DRUG: CPT | Mod: 50,S$GLB,, | Performed by: OPHTHALMOLOGY

## 2017-05-31 PROCEDURE — 92134 CPTRZ OPH DX IMG PST SGM RTA: CPT | Mod: S$GLB,,, | Performed by: OPHTHALMOLOGY

## 2017-05-31 RX ORDER — ROSUVASTATIN CALCIUM 40 MG/1
40 TABLET, COATED ORAL
COMMUNITY
Start: 2017-05-05 | End: 2017-11-08 | Stop reason: CLARIF

## 2017-05-31 RX ORDER — ATENOLOL 100 MG/1
TABLET ORAL
COMMUNITY
Start: 2017-05-11 | End: 2017-11-08 | Stop reason: CLARIF

## 2017-05-31 RX ORDER — INSULIN PUMP SYRINGE, 3 ML
EACH MISCELLANEOUS
COMMUNITY
Start: 2017-05-17 | End: 2018-05-17

## 2017-05-31 RX ORDER — CHOLECALCIFEROL (VITAMIN D3) 25 MCG
1000 TABLET ORAL
COMMUNITY

## 2017-05-31 NOTE — PROGRESS NOTES
===============================  05/31/2017   Barrett Zamora,   56 y.o. male   Last visit [facility]: :4/28/2017   Last visit eye dept. 4/28/2017  [place]  Uncorrected distance visual acuity was 20/60 in the right eye and 20/50 +2 in the left eye.   Not recorded         Not recorded         Not recorded        Chief Complaint   Patient presents with    Eye Problem     eylea ou        HPI     Eye Problem    Additional comments: eylea ou           Comments   DM  Bdr  CSME ou. Os>od  Eylea OS#1= 2/23/16  EYLEA OU 4/28/17  AVASTIN OU 1/4/17  PRP OD 1/18/17       Last edited by Kim Saenz on 5/31/2017  2:27 PM. (History)      Read Studies: y  Vitalsy  ________________  5/31/2017  Problem List Items Addressed This Visit     None      Visit Diagnoses    None.         Worse dme ou  By oct  Renal failure     5/31/2017  Diagnosis :  Ou dme  Today:   Eylea (afibercept) 2 mg/0.05 ml Intravitreal Injection , OU   Follow up: rtc  1mo - do        Call 24/7 for any worsening of vision. Check  OU QD. Gave my home phone number.      Procedure  Note:   OU}  Eylea (afibercept) 2 mg/0.05 ml Intravitreal Injection    I have explained the Risks, Benefits and Alternatives of the procedure in detail.  The patient voices understanding and all questions have been answered.  The patient agrees to proceed as discussed.  LIDOCAINE 2%  Sub conj bleb  was used for anesthesia.  Topical betadine was used for antisepsis.  0.05 cc was  injected 3.7 mm from corneal limbus in the inferotemporal quadrant.  Following injection the IOP was less than thirty (<30) by tonopen.  The eye was then thoroughly irrigated with BSS.  Patient tolerated procedure well.  No complications were observed.  The Patient was educated that mild irritation tonight was normal secondary to topical antispsis use.  Pt was advised to call at any time day or night for pain, redness, or any decline in vision. I gave the patient my home number as well as the clinic on call number.  Daily visual checks and Amsler grid testing were reviewed.  ciloxan Antibiotic Drops to be used 4 times daily for 4 days  CURT Costa MD  Procedure ordered: y  Consent: y  Pre auth: y  MAR:y  Opnote: y  Charge capture:y  Sided procedure note: y         ===========================

## 2017-05-31 NOTE — LETTER
May 31, 2017    Barrett Zamora  [address] [address]             [facility]  [address] [address]  Phone: [phone]  Fax: [phone] To Whom It May Concern,     My patient Barrett Zamora has been undergoing treatment to both eyes for Diabetic Retinopathy. He has been getting treatment of and on for over a year.        If you have any questions or concerns, please don't hesitate to call.    Sincerely,    Dr. Chavarria Creed

## 2017-07-05 ENCOUNTER — PROCEDURE VISIT (OUTPATIENT)
Dept: OPHTHALMOLOGY | Facility: CLINIC | Age: 57
End: 2017-07-05
Payer: COMMERCIAL

## 2017-07-05 DIAGNOSIS — Z79.4 TYPE 2 DIABETES MELLITUS WITH BOTH EYES AFFECTED BY MILD NONPROLIFERATIVE RETINOPATHY WITHOUT MACULAR EDEMA, WITH LONG-TERM CURRENT USE OF INSULIN: ICD-10-CM

## 2017-07-05 DIAGNOSIS — E11.3299 BDR (BACKGROUND DIABETIC RETINOPATHY): Primary | ICD-10-CM

## 2017-07-05 DIAGNOSIS — E11.3293 TYPE 2 DIABETES MELLITUS WITH BOTH EYES AFFECTED BY MILD NONPROLIFERATIVE RETINOPATHY WITHOUT MACULAR EDEMA, WITH LONG-TERM CURRENT USE OF INSULIN: ICD-10-CM

## 2017-07-05 PROCEDURE — 67028 INJECTION EYE DRUG: CPT | Mod: 50,S$GLB,, | Performed by: OPHTHALMOLOGY

## 2017-07-05 PROCEDURE — 92134 CPTRZ OPH DX IMG PST SGM RTA: CPT | Mod: S$GLB,,, | Performed by: OPHTHALMOLOGY

## 2017-07-05 PROCEDURE — 99499 UNLISTED E&M SERVICE: CPT | Mod: S$GLB,,, | Performed by: OPHTHALMOLOGY

## 2017-07-05 RX ORDER — PREDNISOLONE SODIUM PHOSPHATE 10 MG/ML
1 SOLUTION/ DROPS OPHTHALMIC 4 TIMES DAILY
Qty: 5 ML | Refills: 0 | Status: SHIPPED | OUTPATIENT
Start: 2017-07-05 | End: 2017-09-03

## 2017-07-05 NOTE — PROGRESS NOTES
===============================  07/05/2017   Barrett Zamora,   56 y.o. male   Last visit [facility]: :5/31/2017   Last visit eye dept. 5/31/2017  [place]:  Visual acuity was not recorded.   Not recorded         Not recorded         Not recorded        Chief Complaint   Patient presents with    dme     here for eylea ou        HPI     dme    Additional comments: here for eylea ou           Comments   DM  Bdr  CSME ou. Os>od  Eylea OS#1= 2/23/16  EYLEA OU 5/31/17  AVASTIN OU 1/4/17  PRP OD 1/18/17       Last edited by REJI Bah on 7/5/2017  3:28 PM. (History)      Read Studies: y  Vitalsy  ________________  7/5/2017  Problem List Items Addressed This Visit     None      Visit Diagnoses    None.       y.  dme oct only sl; better   Add pf qid   va better     7/5/2017  Diagnosis :  Ou dme  Today:   Eylea (afibercept) 2 mg/0.05 ml Intravitreal Injection , OU   Follow up: rtc 1 mo        Call 24/7 for any worsening of vision. Check  OU QD. Gave my home phone number.      Procedure  Note:   OU}  Eylea (afibercept) 2 mg/0.05 ml Intravitreal Injection    I have explained the Risks, Benefits and Alternatives of the procedure in detail.  The patient voices understanding and all questions have been answered.  The patient agrees to proceed as discussed.  LIDOCAINE 2%  Sub conj bleb  was used for anesthesia.  Topical betadine was used for antisepsis.  0.05 cc was  injected 3.7 mm from corneal limbus in the inferotemporal quadrant.  Following injection the IOP was less than thirty (<30) by tonopen.  The eye was then thoroughly irrigated with BSS.  Patient tolerated procedure well.  No complications were observed.  The Patient was educated that mild irritation tonight was normal secondary to topical antispsis use.  Pt was advised to call at any time day or night for pain, redness, or any decline in vision. I gave the patient my home number as well as the clinic on call number. Daily visual checks and Amsler grid testing  were reviewed.  ciloxan Antibiotic Drops to be used 4 times daily for 4 days  CURT Costa MD  Procedure ordered: y  Consent: y  Pre auth: y  MAR:y  Opnote: y  Charge capture:y  Sided procedure note: y         ===========================

## 2017-08-16 ENCOUNTER — PROCEDURE VISIT (OUTPATIENT)
Dept: OPHTHALMOLOGY | Facility: CLINIC | Age: 57
End: 2017-08-16
Payer: COMMERCIAL

## 2017-08-16 DIAGNOSIS — E11.3213 TYPE 2 DIABETES MELLITUS WITH BOTH EYES AFFECTED BY MILD NONPROLIFERATIVE RETINOPATHY AND MACULAR EDEMA, WITH LONG-TERM CURRENT USE OF INSULIN: Primary | ICD-10-CM

## 2017-08-16 DIAGNOSIS — Z79.4 TYPE 2 DIABETES MELLITUS WITH BOTH EYES AFFECTED BY MILD NONPROLIFERATIVE RETINOPATHY AND MACULAR EDEMA, WITH LONG-TERM CURRENT USE OF INSULIN: Primary | ICD-10-CM

## 2017-08-16 PROCEDURE — 92134 CPTRZ OPH DX IMG PST SGM RTA: CPT | Mod: S$GLB,,, | Performed by: OPHTHALMOLOGY

## 2017-08-16 PROCEDURE — 67028 INJECTION EYE DRUG: CPT | Mod: 50,S$GLB,, | Performed by: OPHTHALMOLOGY

## 2017-08-16 PROCEDURE — 99499 UNLISTED E&M SERVICE: CPT | Mod: S$GLB,,, | Performed by: OPHTHALMOLOGY

## 2017-08-16 RX ORDER — ROSUVASTATIN CALCIUM 20 MG/1
TABLET, COATED ORAL
COMMUNITY
Start: 2017-07-26 | End: 2017-11-08 | Stop reason: CLARIF

## 2017-08-16 RX ORDER — BUPROPION HYDROCHLORIDE 300 MG/1
TABLET ORAL
COMMUNITY
Start: 2017-08-08 | End: 2017-11-08 | Stop reason: CLARIF

## 2017-08-16 RX ORDER — INSULIN LISPRO 100 [IU]/ML
INJECTION, SOLUTION INTRAVENOUS; SUBCUTANEOUS
COMMUNITY
Start: 2017-06-12 | End: 2017-11-08 | Stop reason: CLARIF

## 2017-08-16 RX ORDER — FUROSEMIDE 20 MG/1
20 TABLET ORAL
COMMUNITY
Start: 2016-01-03 | End: 2017-11-08 | Stop reason: CLARIF

## 2017-08-16 NOTE — PROGRESS NOTES
"    ===============================  08/16/2017   Barrett Zamora,   56 y.o. male   [facility]   Last visit eye dept. 7/5/2017  [facility]  Uncorrected distance visual acuity was 20/70 in the right eye and 20/40 in the left eye.   Not recorded         Not recorded         Not recorded        Chief Complaint   Patient presents with    BDR     EYLEA OU     Ophthalmic Medications     Ophthalmic - Anti-inflammatory, Glucocorticoids Start End    prednisoLONE sodium phosphate (INFLAMASE FORTE) 1 % Drop 7/5/2017 9/3/2017    Sig: Place 1 drop into both eyes 4 (four) times daily.    Route: Both Eyes           Read Studies: y  Jones  ________________  8/16/2017  Problem List Items Addressed This Visit        Eye/Vision problems    Diabetes mellitus - Primary    Relevant Medications    aflibercept Soln 2 mg (Start on 8/16/2017  4:15 PM)    Other Relevant Orders    Posterior Segment OCT Retina-Both eyes    Prior Authorization Order      Other Visit Diagnoses    None.       .  ."i feel chance im not beter"  bc va 20 /70  40  dme better ou   Did not take pf  Oct mionaml respones to eyel;a  Bg htn no new edema  New on lasix    8/16/2017  Diagnosis :  Ou dme  Today:   Eylea (afibercept) 2 mg/0.05 ml Intravitreal Injection , OU   Follow up: rtc 1 mo        Call 24/7 for any worsening of vision. Check  OU QD. Gave my home phone number.      Procedure  Note:   OU}  Eylea (afibercept) 2 mg/0.05 ml Intravitreal Injection    I have explained the Risks, Benefits and Alternatives of the procedure in detail.  The patient voices understanding and all questions have been answered.  The patient agrees to proceed as discussed.  LIDOCAINE 2%  subconj bleb  was used for anesthesia.  Topical betadine was used for antisepsis.  0.05 cc was  injected 3.7 mm from corneal limbus in the inferotemporal quadrant.  Following injection the IOP was less than thirty (<30) by tonopen.  The eye was then thoroughly irrigated with BSS.  Patient " tolerated procedure well.  No complications were observed.  The Patient was educated that mild irritation tonight was normal secondary to topical antispsis use.  Pt was advised to call at any time day or night for pain, redness, or any decline in vision. I gave the patient my home number as well as the clinic on call number. Daily visual checks and Amsler grid testing were reviewed.  ciloxan Antibiotic Drops to be used 4 times daily for 4 days  CURT Costa MD  Procedure ordered: y  Consent: y  Pre auth: y  MAR:y  Opnote: y  Charge capture:y  Sided procedure note: y           ===========================

## 2017-10-03 ENCOUNTER — TELEPHONE (OUTPATIENT)
Dept: OPHTHALMOLOGY | Facility: CLINIC | Age: 57
End: 2017-10-03

## 2017-10-03 NOTE — TELEPHONE ENCOUNTER
----- Message from Courtney Quigley sent at 10/3/2017 11:23 AM CDT -----  Contact: pt  [phone] need to reschedule proc he missed

## 2017-10-05 ENCOUNTER — PROCEDURE VISIT (OUTPATIENT)
Dept: OPHTHALMOLOGY | Facility: CLINIC | Age: 57
End: 2017-10-05
Payer: COMMERCIAL

## 2017-10-05 DIAGNOSIS — E11.3213 TYPE 2 DIABETES MELLITUS WITH BOTH EYES AFFECTED BY MILD NONPROLIFERATIVE RETINOPATHY AND MACULAR EDEMA, WITH LONG-TERM CURRENT USE OF INSULIN: Primary | ICD-10-CM

## 2017-10-05 DIAGNOSIS — Z79.4 TYPE 2 DIABETES MELLITUS WITH BOTH EYES AFFECTED BY MILD NONPROLIFERATIVE RETINOPATHY AND MACULAR EDEMA, WITH LONG-TERM CURRENT USE OF INSULIN: Primary | ICD-10-CM

## 2017-10-05 PROCEDURE — 92250 FUNDUS PHOTOGRAPHY W/I&R: CPT | Mod: S$GLB,,, | Performed by: OPHTHALMOLOGY

## 2017-10-05 PROCEDURE — 67028 INJECTION EYE DRUG: CPT | Mod: 50,S$GLB,, | Performed by: OPHTHALMOLOGY

## 2017-10-05 PROCEDURE — 99499 UNLISTED E&M SERVICE: CPT | Mod: S$GLB,,, | Performed by: OPHTHALMOLOGY

## 2017-10-05 RX ORDER — ATENOLOL 100 MG/1
TABLET ORAL
COMMUNITY
Start: 2017-09-13 | End: 2017-11-08 | Stop reason: CLARIF

## 2017-10-05 RX ORDER — INSULIN GLARGINE 100 [IU]/ML
80 INJECTION, SOLUTION SUBCUTANEOUS
COMMUNITY
Start: 2017-09-22

## 2017-10-05 RX ORDER — DULOXETIN HYDROCHLORIDE 60 MG/1
CAPSULE, DELAYED RELEASE ORAL
COMMUNITY
Start: 2017-08-28 | End: 2018-01-10

## 2017-10-05 NOTE — PROGRESS NOTES
===============================  10/05/2017   Barrett Zamora,   56 y.o. male   Last visit [facility]: :8/16/2017   Last visit eye dept. 8/16/2017  [place]  Uncorrected distance visual acuity was 20/80 in the right eye and 20/40 in the left eye.   Not recorded         Not recorded         Not recorded        Chief Complaint   Patient presents with    BDR     EYLEA OU        HPI     BDR    Additional comments: EYLEA OU           Comments   DM  Bdr  CSME ou. Os>od  Eylea OS#1= 2/23/16  EYLEA OU 8/16/17  AVASTIN OU 1/4/17  PRP OD 1/18/17       Last edited by Kim Saenz on 10/5/2017  2:05 PM. (History)          ________________  10/5/2017  Problem List Items Addressed This Visit        Eye/Vision problems    Type 2 diabetes mellitus with both eyes affected by mild nonproliferative retinopathy and macular edema, with long-term current use of insulin - Primary    Relevant Medications    aflibercept Soln 2 mg (Start on 10/5/2017  3:30 PM)    aflibercept Soln 2 mg (Start on 10/5/2017  3:30 PM)    Other Relevant Orders    Prior Authorization Order    Color Fundus Photography - OU - Both Eyes (Completed)      Other Visit Diagnoses    None.       Ou dme - bdr  Volume overload per pt   od dme - stabl e  Os sl better  +sob  Tibial edema  '        10/5/2017  Diagnosis :  Ou dme  Today:   Eylea (afibercept) 2 mg/0.05 ml Intravitreal Injection , OU   Follow up: rtc 1mo        Call 24/7 for any worsening of vision. Check  OU QD. Gave my home phone number.      Procedure  Note:   OU}  Eylea (afibercept) 2 mg/0.05 ml Intravitreal Injection    I have explained the Risks, Benefits and Alternatives of the procedure in detail.  The patient voices understanding and all questions have been answered.  The patient agrees to proceed as discussed.  LIDOCAINE 2%  subconj bleb  was used for anesthesia.  Topical betadine was used for antisepsis.  0.05 cc was  injected 3.7 mm from corneal limbus in the inferotemporal quadrant.  Following  injection the IOP was less than thirty (<30) by tonopen.  The eye was then thoroughly irrigated with BSS.  Patient tolerated procedure well.  No complications were observed.  The Patient was educated that mild irritation tonight was normal secondary to topical antispsis use.  Pt was advised to call at any time day or night for pain, redness, or any decline in vision. I gave the patient my home number as well as the clinic on call number. Daily visual checks and Amsler grid testing were reviewed.  ciloxan Antibiotic Drops to be used 4 times daily for 4 days  CURT Costa MD  Procedure ordered: y  Consent: y  Pre auth: y  MAR:y  Opnote: y  Charge capture:y  Sided procedure note: y         ===========================

## 2017-11-08 ENCOUNTER — PROCEDURE VISIT (OUTPATIENT)
Dept: OPHTHALMOLOGY | Facility: CLINIC | Age: 57
End: 2017-11-08
Payer: COMMERCIAL

## 2017-11-08 DIAGNOSIS — Z79.4 TYPE 2 DIABETES MELLITUS WITH BOTH EYES AFFECTED BY MILD NONPROLIFERATIVE RETINOPATHY AND MACULAR EDEMA, WITH LONG-TERM CURRENT USE OF INSULIN: Primary | ICD-10-CM

## 2017-11-08 DIAGNOSIS — E11.3213 TYPE 2 DIABETES MELLITUS WITH BOTH EYES AFFECTED BY MILD NONPROLIFERATIVE RETINOPATHY AND MACULAR EDEMA, WITH LONG-TERM CURRENT USE OF INSULIN: Primary | ICD-10-CM

## 2017-11-08 PROCEDURE — 99499 UNLISTED E&M SERVICE: CPT | Mod: S$GLB,,, | Performed by: OPHTHALMOLOGY

## 2017-11-08 PROCEDURE — 67028 INJECTION EYE DRUG: CPT | Mod: 50,S$GLB,, | Performed by: OPHTHALMOLOGY

## 2017-11-08 PROCEDURE — 92134 CPTRZ OPH DX IMG PST SGM RTA: CPT | Mod: S$GLB,,, | Performed by: OPHTHALMOLOGY

## 2017-11-08 NOTE — PROGRESS NOTES
===============================  11/08/2017   Barrett Zamora,   57 y.o. male   Last visit [facility]: :10/5/2017   Last visit eye dept. 10/5/2017  [place]  Uncorrected distance visual acuity was 20/60 in the right eye and 20/40 in the left eye.   Not recorded         Not recorded         Not recorded        Chief Complaint   Patient presents with    PDR/ME     EYLEA OU        HPI     PDR/ME    Additional comments: EYLEA OU           Comments   DM  Bdr  CSME ou. Os>od  Eylea OS#1= 2/23/16  EYLEA OU 8/16/17 10/5/17  AVASTIN OU 1/4/17  PRP OD 1/18/17       Last edited by Kim Saenz on 11/8/2017  2:51 PM. (History)          ________________  11/8/2017  Problem List Items Addressed This Visit        Eye/Vision problems    Type 2 diabetes mellitus with both eyes affected by mild nonproliferative retinopathy and macular edema, with long-term current use of insulin - Primary    Relevant Medications    aflibercept Soln 2 mg (Completed) (Start on 11/8/2017  3:30 PM)    aflibercept Soln 2 mg (Completed) (Start on 11/8/2017  3:30 PM)    Other Relevant Orders    Prior Authorization Order    Posterior Segment OCT Retina-Both eyes        Oct beter ou   rec vulome eval   undergoing ou dme eylea    11/8/2017  Diagnosis :  Ou dmne  Today:   Eylea (afibercept) 2 mg/0.05 ml Intravitreal Injection , OU   Follow up: rtc 1 mo        Call 24/7 for any worsening of vision. Check  OU QD. Gave my home phone number.      Procedure  Note:   OU}  Eylea (afibercept) 2 mg/0.05 ml Intravitreal Injection    I have explained the Risks, Benefits and Alternatives of the procedure in detail.  The patient voices understanding and all questions have been answered.  The patient agrees to proceed as discussed.  LIDOCAINE 2%  subconj bleb  was used for anesthesia.  Topical betadine was used for antisepsis.  0.05 cc was  injected 3.7 mm from corneal limbus in the inferotemporal quadrant.  Following injection the IOP was less than thirty (<30) by  tonopen.  The eye was then thoroughly irrigated with BSS.  Patient tolerated procedure well.  No complications were observed.  The Patient was educated that mild irritation tonight was normal secondary to topical antispsis use.  Pt was advised to call at any time day or night for pain, redness, or any decline in vision. I gave the patient my home number as well as the clinic on call number. Daily visual checks and Amsler grid testing were reviewed.  ciloxan Antibiotic Drops to be used 4 times daily for 4 days  CURT Costa MD  Procedure ordered: y  Consent: y  Pre auth: y  MAR:y  Opnote: y  Charge capture:y  Sided procedure note: y      .       ===========================

## 2017-12-07 ENCOUNTER — PROCEDURE VISIT (OUTPATIENT)
Dept: OPHTHALMOLOGY | Facility: CLINIC | Age: 57
End: 2017-12-07
Payer: COMMERCIAL

## 2017-12-07 DIAGNOSIS — E11.3213 BOTH EYES AFFECTED BY MILD NONPROLIFERATIVE DIABETIC RETINOPATHY WITH MACULAR EDEMA, ASSOCIATED WITH TYPE 2 DIABETES MELLITUS: Primary | ICD-10-CM

## 2017-12-07 PROCEDURE — 92134 CPTRZ OPH DX IMG PST SGM RTA: CPT | Mod: S$GLB,ICN,, | Performed by: OPHTHALMOLOGY

## 2017-12-07 PROCEDURE — 99499 UNLISTED E&M SERVICE: CPT | Mod: S$GLB,ICN,, | Performed by: OPHTHALMOLOGY

## 2017-12-07 PROCEDURE — 67028 INJECTION EYE DRUG: CPT | Mod: 50,S$GLB,ICN, | Performed by: OPHTHALMOLOGY

## 2017-12-07 RX ORDER — PRASUGREL 10 MG/1
TABLET, FILM COATED ORAL
COMMUNITY
Start: 2017-11-13

## 2017-12-07 RX ORDER — ROSUVASTATIN CALCIUM 20 MG/1
TABLET, COATED ORAL
COMMUNITY
Start: 2017-11-30

## 2017-12-07 NOTE — PROGRESS NOTES
===============================  12/07/2017   Barrett Zamora,   57 y.o. male   Last visit [facility]: :11/8/2017   Last visit eye dept. 11/8/2017  [facility]:  Uncorrected distance visual acuity was 20/50 in the right eye and 20/40 in the left eye.   Not recorded         Not recorded         Not recorded        Chief Complaint   Patient presents with    PROCEDURE     EYLEA OU        HPI     PROCEDURE    Additional comments: EYLEA OU           Comments   DM  Bdr  CSME ou. Os>od  Eylea OS#1= 2/23/16  EYLEA OU 8/16/17 10/5/17  AVASTIN OU 1/4/17  PRP OD 1/18/17       Last edited by Jessica Horton MA on 12/7/2017  3:08 PM. (History)          ________________  12/7/2017  Problem List Items Addressed This Visit     None        Ou pdr     12/7/2017  Diagnosis :  Ou dme  Today:   Eylea (afibercept) 2 mg/0.05 ml Intravitreal Injection , OU   Follow up: rtc 1 mo        Call 24/7 for any worsening of vision. Check  OU QD. Gave my home phone number.      Procedure  Note:   OU}  Eylea (afibercept) 2 mg/0.05 ml Intravitreal Injection    I have explained the Risks, Benefits and Alternatives of the procedure in detail.  The patient voices understanding and all questions have been answered.  The patient agrees to proceed as discussed.  LIDOCAINE 2%  Sub conj bleb  was used for anesthesia.  Topical betadine was used for antisepsis.  0.05 cc was  injected 3.7 mm from corneal limbus in the inferotemporal quadrant.  Following injection the IOP was less than thirty (<30) by tonopen.  The eye was then thoroughly irrigated with BSS.  Patient tolerated procedure well.  No complications were observed.  The Patient was educated that mild irritation tonight was normal secondary to topical antispsis use.  Pt was advised to call at any time day or night for pain, redness, or any decline in vision. I gave the patient my home number as well as the clinic on call number. Daily visual checks and Amsler grid testing were reviewed.  ciloxan Antibiotic Drops  to be used 4 times daily for 4 days  CURT Costa MD  Procedure ordered: y  Consent: y  Pre auth: y  MAR:y  Opnote: y  Charge capture:y  Sided procedure note: y    .       ===========================

## 2018-01-10 ENCOUNTER — PROCEDURE VISIT (OUTPATIENT)
Dept: OPHTHALMOLOGY | Facility: CLINIC | Age: 58
End: 2018-01-10
Payer: COMMERCIAL

## 2018-01-10 DIAGNOSIS — Z79.4 TYPE 2 DIABETES MELLITUS WITH BOTH EYES AFFECTED BY MILD NONPROLIFERATIVE RETINOPATHY AND MACULAR EDEMA, WITH LONG-TERM CURRENT USE OF INSULIN: Primary | ICD-10-CM

## 2018-01-10 DIAGNOSIS — E11.3213 TYPE 2 DIABETES MELLITUS WITH BOTH EYES AFFECTED BY MILD NONPROLIFERATIVE RETINOPATHY AND MACULAR EDEMA, WITH LONG-TERM CURRENT USE OF INSULIN: Primary | ICD-10-CM

## 2018-01-10 PROCEDURE — 67028 INJECTION EYE DRUG: CPT | Mod: 50,S$GLB,, | Performed by: OPHTHALMOLOGY

## 2018-01-10 PROCEDURE — 99499 UNLISTED E&M SERVICE: CPT | Mod: S$GLB,,, | Performed by: OPHTHALMOLOGY

## 2018-01-10 PROCEDURE — 92134 CPTRZ OPH DX IMG PST SGM RTA: CPT | Mod: S$GLB,,, | Performed by: OPHTHALMOLOGY

## 2018-01-10 RX ORDER — METOPROLOL TARTRATE 50 MG/1
50 TABLET ORAL 2 TIMES DAILY
COMMUNITY

## 2018-01-10 NOTE — PROGRESS NOTES
===============================  01/10/2018   Barrett Zamora,   57 y.o. male   Last visit [facility]: :12/7/2017   Last visit eye dept. 12/7/2017  [facility]  Uncorrected distance visual acuity was 20/50 in the right eye and 20/30 in the left eye.   Not recorded         Not recorded         Not recorded        Chief Complaint   Patient presents with    csme     eylea ou, pt states vision is the same as at last visit        HPI     csme    Additional comments: eylea ou, pt states vision is the same as at last   visit           Comments   DM  Bdr  CSME ou. Os>od  Eylea OS#1= 2/23/16  EYLEA OU 8/16/17 10/5/17 12/7/17  AVASTIN OU 1/4/17  PRP OD 1/18/17       Last edited by REJI Bah on 1/10/2018  2:54 PM. (History)          ________________  1/10/2018  Problem List Items Addressed This Visit        Eye/Vision problems    Type 2 diabetes mellitus with both eyes affected by mild nonproliferative retinopathy and macular edema, with long-term current use of insulin - Primary    Relevant Medications    aflibercept Soln 2 mg (Completed)    aflibercept Soln 2 mg (Completed)    Other Relevant Orders    Prior Authorization Order    Posterior Segment OCT Retina-Both eyes (Completed)          .  1/10/2018  Diagnosis :  Ou dme  Today:   Eylea (afibercept) 2 mg/0.05 ml Intravitreal Injection , OU   Follow up: rtc 1 mo do ou        Call 24/7 for any worsening of vision. Check  OU QD. Gave my home phone number.      Procedure  Note:   OU}  Eylea (afibercept) 2 mg/0.05 ml Intravitreal Injection    I have explained the Risks, Benefits and Alternatives of the procedure in detail.  The patient voices understanding and all questions have been answered.  The patient agrees to proceed as discussed.  LIDOCAINE 2%  subconj bleb  was used for anesthesia.  Topical betadine was used for antisepsis.  0.05 cc was  injected 3.7 mm from corneal limbus in the inferotemporal quadrant.  Following injection the IOP was less than thirty (<30) by  tonopen.  The eye was then thoroughly irrigated with BSS.  Patient tolerated procedure well.  No complications were observed.  The Patient was educated that mild irritation tonight was normal secondary to topical antispsis use.  Pt was advised to call at any time day or night for pain, redness, or any decline in vision. I gave the patient my home number as well as the clinic on call number. Daily visual checks and Amsler grid testing were reviewed.  ciloxan Antibiotic Drops to be used 4 times daily for 4 days  CURT Costa MD  Procedure ordered: y  Consent: y  Pre auth: y  MAR:y  Opnote: y  Charge capture:y  Sided procedure note: y       ===========================